# Patient Record
Sex: FEMALE | Race: WHITE | NOT HISPANIC OR LATINO | Employment: OTHER | URBAN - METROPOLITAN AREA
[De-identification: names, ages, dates, MRNs, and addresses within clinical notes are randomized per-mention and may not be internally consistent; named-entity substitution may affect disease eponyms.]

---

## 2022-07-04 ENCOUNTER — HOSPITAL ENCOUNTER (EMERGENCY)
Facility: HOSPITAL | Age: 87
Discharge: HOME | End: 2022-07-05
Attending: EMERGENCY MEDICINE
Payer: MEDICARE

## 2022-07-04 DIAGNOSIS — R56.9 SEIZURE-LIKE ACTIVITY: ICD-10-CM

## 2022-07-04 DIAGNOSIS — R41.82 ALTERED MENTAL STATUS: ICD-10-CM

## 2022-07-04 DIAGNOSIS — N39.0 URINARY TRACT INFECTION WITHOUT HEMATURIA, SITE UNSPECIFIED: Primary | ICD-10-CM

## 2022-07-04 LAB
ALBUMIN SERPL BCP-MCNC: 3.7 G/DL (ref 3.5–5.2)
ALP SERPL-CCNC: 36 U/L (ref 55–135)
ALT SERPL W/O P-5'-P-CCNC: 6 U/L (ref 10–44)
ANION GAP SERPL CALC-SCNC: 13 MMOL/L (ref 8–16)
AST SERPL-CCNC: 15 U/L (ref 10–40)
BASOPHILS # BLD AUTO: 0.05 K/UL (ref 0–0.2)
BASOPHILS NFR BLD: 0.3 % (ref 0–1.9)
BILIRUB SERPL-MCNC: 0.5 MG/DL (ref 0.1–1)
BUN SERPL-MCNC: 24 MG/DL (ref 10–30)
CALCIUM SERPL-MCNC: 10.4 MG/DL (ref 8.7–10.5)
CHLORIDE SERPL-SCNC: 104 MMOL/L (ref 95–110)
CO2 SERPL-SCNC: 22 MMOL/L (ref 23–29)
CREAT SERPL-MCNC: 1.1 MG/DL (ref 0.5–1.4)
DIFFERENTIAL METHOD: ABNORMAL
EOSINOPHIL # BLD AUTO: 0.1 K/UL (ref 0–0.5)
EOSINOPHIL NFR BLD: 0.5 % (ref 0–8)
ERYTHROCYTE [DISTWIDTH] IN BLOOD BY AUTOMATED COUNT: 13.2 % (ref 11.5–14.5)
EST. GFR  (AFRICAN AMERICAN): 49 ML/MIN/1.73 M^2
EST. GFR  (NON AFRICAN AMERICAN): 42 ML/MIN/1.73 M^2
GLUCOSE SERPL-MCNC: 153 MG/DL (ref 70–110)
HCT VFR BLD AUTO: 45.1 % (ref 37–48.5)
HGB BLD-MCNC: 15.2 G/DL (ref 12–16)
IMM GRANULOCYTES # BLD AUTO: 0.16 K/UL (ref 0–0.04)
IMM GRANULOCYTES NFR BLD AUTO: 0.8 % (ref 0–0.5)
LYMPHOCYTES # BLD AUTO: 2.3 K/UL (ref 1–4.8)
LYMPHOCYTES NFR BLD: 11.6 % (ref 18–48)
MAGNESIUM SERPL-MCNC: 1.8 MG/DL (ref 1.6–2.6)
MCH RBC QN AUTO: 32.4 PG (ref 27–31)
MCHC RBC AUTO-ENTMCNC: 33.7 G/DL (ref 32–36)
MCV RBC AUTO: 96 FL (ref 82–98)
MONOCYTES # BLD AUTO: 1.9 K/UL (ref 0.3–1)
MONOCYTES NFR BLD: 9.7 % (ref 4–15)
NEUTROPHILS # BLD AUTO: 15.1 K/UL (ref 1.8–7.7)
NEUTROPHILS NFR BLD: 77.1 % (ref 38–73)
NRBC BLD-RTO: 0 /100 WBC
PHOSPHATE SERPL-MCNC: 4.2 MG/DL (ref 2.7–4.5)
PLATELET # BLD AUTO: 325 K/UL (ref 150–450)
PMV BLD AUTO: 9.7 FL (ref 9.2–12.9)
POTASSIUM SERPL-SCNC: 4.1 MMOL/L (ref 3.5–5.1)
PROT SERPL-MCNC: 7.1 G/DL (ref 6–8.4)
RBC # BLD AUTO: 4.69 M/UL (ref 4–5.4)
SARS-COV-2 RDRP RESP QL NAA+PROBE: NEGATIVE
SODIUM SERPL-SCNC: 139 MMOL/L (ref 136–145)
WBC # BLD AUTO: 19.52 K/UL (ref 3.9–12.7)

## 2022-07-04 PROCEDURE — U0002 COVID-19 LAB TEST NON-CDC: HCPCS | Performed by: EMERGENCY MEDICINE

## 2022-07-04 PROCEDURE — 93005 ELECTROCARDIOGRAM TRACING: CPT

## 2022-07-04 PROCEDURE — 83605 ASSAY OF LACTIC ACID: CPT | Performed by: EMERGENCY MEDICINE

## 2022-07-04 PROCEDURE — 81000 URINALYSIS NONAUTO W/SCOPE: CPT | Performed by: EMERGENCY MEDICINE

## 2022-07-04 PROCEDURE — 84100 ASSAY OF PHOSPHORUS: CPT | Performed by: EMERGENCY MEDICINE

## 2022-07-04 PROCEDURE — 83735 ASSAY OF MAGNESIUM: CPT | Performed by: EMERGENCY MEDICINE

## 2022-07-04 PROCEDURE — 99285 EMERGENCY DEPT VISIT HI MDM: CPT | Mod: 25

## 2022-07-04 PROCEDURE — 84439 ASSAY OF FREE THYROXINE: CPT | Performed by: EMERGENCY MEDICINE

## 2022-07-04 PROCEDURE — 80053 COMPREHEN METABOLIC PANEL: CPT | Performed by: EMERGENCY MEDICINE

## 2022-07-04 PROCEDURE — 96365 THER/PROPH/DIAG IV INF INIT: CPT

## 2022-07-04 PROCEDURE — 87086 URINE CULTURE/COLONY COUNT: CPT | Performed by: EMERGENCY MEDICINE

## 2022-07-04 PROCEDURE — 93010 EKG 12-LEAD: ICD-10-PCS | Mod: ,,, | Performed by: INTERNAL MEDICINE

## 2022-07-04 PROCEDURE — 84443 ASSAY THYROID STIM HORMONE: CPT | Performed by: EMERGENCY MEDICINE

## 2022-07-04 PROCEDURE — 84484 ASSAY OF TROPONIN QUANT: CPT | Performed by: EMERGENCY MEDICINE

## 2022-07-04 PROCEDURE — 93010 ELECTROCARDIOGRAM REPORT: CPT | Mod: ,,, | Performed by: INTERNAL MEDICINE

## 2022-07-04 PROCEDURE — 85025 COMPLETE CBC W/AUTO DIFF WBC: CPT | Performed by: EMERGENCY MEDICINE

## 2022-07-05 VITALS
WEIGHT: 117 LBS | BODY MASS INDEX: 20.73 KG/M2 | DIASTOLIC BLOOD PRESSURE: 64 MMHG | SYSTOLIC BLOOD PRESSURE: 138 MMHG | HEART RATE: 76 BPM | OXYGEN SATURATION: 96 % | TEMPERATURE: 99 F | RESPIRATION RATE: 15 BRPM

## 2022-07-05 LAB
BACTERIA #/AREA URNS HPF: ABNORMAL /HPF
BILIRUB UR QL STRIP: NEGATIVE
CLARITY UR: CLEAR
COLOR UR: YELLOW
GLUCOSE UR QL STRIP: NEGATIVE
HGB UR QL STRIP: ABNORMAL
HYALINE CASTS #/AREA URNS LPF: 0 /LPF
KETONES UR QL STRIP: NEGATIVE
LACTATE SERPL-SCNC: 1.5 MMOL/L (ref 0.5–2.2)
LEUKOCYTE ESTERASE UR QL STRIP: ABNORMAL
MICROSCOPIC COMMENT: ABNORMAL
NITRITE UR QL STRIP: NEGATIVE
PH UR STRIP: 6 [PH] (ref 5–8)
PROT UR QL STRIP: ABNORMAL
RBC #/AREA URNS HPF: 1 /HPF (ref 0–4)
SP GR UR STRIP: 1.02 (ref 1–1.03)
T4 FREE SERPL-MCNC: 1.25 NG/DL (ref 0.71–1.51)
TROPONIN I SERPL DL<=0.01 NG/ML-MCNC: <0.006 NG/ML (ref 0–0.03)
TSH SERPL DL<=0.005 MIU/L-ACNC: 7.4 UIU/ML (ref 0.4–4)
URN SPEC COLLECT METH UR: ABNORMAL
UROBILINOGEN UR STRIP-ACNC: NEGATIVE EU/DL
WBC #/AREA URNS HPF: 65 /HPF (ref 0–5)
WBC CLUMPS URNS QL MICRO: ABNORMAL

## 2022-07-05 PROCEDURE — 63600175 PHARM REV CODE 636 W HCPCS: Performed by: EMERGENCY MEDICINE

## 2022-07-05 RX ORDER — CEFDINIR 300 MG/1
300 CAPSULE ORAL 2 TIMES DAILY
Qty: 10 CAPSULE | Refills: 0 | Status: SHIPPED | OUTPATIENT
Start: 2022-07-05 | End: 2022-07-10

## 2022-07-05 RX ADMIN — CEFTRIAXONE 1 G: 1 INJECTION, SOLUTION INTRAVENOUS at 01:07

## 2022-07-05 NOTE — ED PROVIDER NOTES
SCRIBE #1 NOTE: ILauren, am scribing for, and in the presence of, No att. providers found. I have scribed the entire note.       History     Chief Complaint   Patient presents with    Altered Mental Status     Pt was brought in by ems due to having altered mental status. Pt is awake but not alert. Hx of a stroke with right sided weakness.      Review of patient's allergies indicates:   Allergen Reactions    Bactrim [sulfamethoxazole-trimethoprim] Rash    Gentamicin Rash    Sulfa (sulfonamide antibiotics) Rash         History of Present Illness     HPI    7/5/2022, 12:09 AM  History obtained from the daughter and patient      History of Present Illness: Martine Garvey is a 89 y.o. female patient with a PMHx of CAD and HTN who presents to the Emergency Department for evaluation of possible seizure which onset tonight. Pt went to the bathroom as she started to stand she sat back down in a dead stare and started drooling. Symptoms are constant and moderate in severity. No mitigating or exacerbating factors reported. Associated sxs include  Abdominal pain. Patient denies any Fever, chills, vomiting, SOB, sore throat, and all other sxs at this time. No further complaints or concerns at this time.       Arrival mode: EMS     PCP: Primary Doctor No        Past Medical History:  Past Medical History:   Diagnosis Date    Afib     Coronary artery disease     Depression     Hypertension     Thyroid disease        Past Surgical History:  Past Surgical History:   Procedure Laterality Date    APPENDECTOMY      BRAIN SURGERY      CHOLECYSTECTOMY      EYE SURGERY      TONSILLECTOMY           Family History:  No family history on file.    Social History:  Social History     Tobacco Use    Smoking status: Never Smoker    Smokeless tobacco: Not on file   Substance and Sexual Activity    Alcohol use: No    Drug use: No    Sexual activity: Not on file        Review of Systems     Review of Systems    Constitutional: Negative for chills and fever.   HENT: Negative for sore throat.    Respiratory: Negative for shortness of breath.    Cardiovascular: Negative for chest pain.   Gastrointestinal: Positive for abdominal pain. Negative for nausea and vomiting.   Genitourinary: Negative for dysuria.   Musculoskeletal: Negative for back pain.   Skin: Negative for rash.   Neurological: Negative for weakness.   Hematological: Does not bruise/bleed easily.   All other systems reviewed and are negative.     Physical Exam     Initial Vitals   BP Pulse Resp Temp SpO2   07/04/22 2143 07/04/22 2143 07/04/22 2143 07/05/22 0144 07/04/22 2143   (!) 176/74 62 15 98.7 °F (37.1 °C) (!) 94 %      MAP       --                 Physical Exam  Nursing Notes and Vital Signs Reviewed.  Constitutional: Patient is in no acute distress. Well-developed and well-nourished.  Head: Atraumatic. Normocephalic.  Eyes: PERRL. EOM intact. Conjunctivae are not pale. No scleral icterus.  ENT: Mucous membranes are moist. Oropharynx is clear and symmetric.    Neck: Supple. Full ROM. No lymphadenopathy.  Cardiovascular: Regular rate. Accelerated junctional rhythm. No murmurs, rubs, or gallops. Distal pulses are 2+ and symmetric.  Pulmonary/Chest: No respiratory distress. Clear to auscultation bilaterally. No wheezing or rales.  Abdominal: Soft and non-distended.  There is no tenderness.  No rebound, guarding, or rigidity. Good bowel sounds.  Genitourinary: No CVA tenderness  Musculoskeletal: Moves all extremities. No obvious deformities. No edema. No calf tenderness.  Skin: Warm and dry.  Neurological:  Alert, awake, and appropriate.  Normal speech.  No acute focal neurological deficits are appreciated.  Psychiatric: Normal affect. Good eye contact. Appropriate in content.     ED Course   Procedures  ED Vital Signs:  Vitals:    07/04/22 2143 07/05/22 0000 07/05/22 0030 07/05/22 0100   BP: (!) 176/74 (!) 150/67 129/62 (!) 140/63   Pulse: 62 72 75 74   Resp:  15   15   Temp:       TempSrc:       SpO2: (!) 94% 96% 96% 96%   Weight: 53.1 kg (117 lb)       07/05/22 0144   BP: 138/64   Pulse: 76   Resp:    Temp: 98.7 °F (37.1 °C)   TempSrc: Oral   SpO2:    Weight:        Abnormal Lab Results:  Labs Reviewed   CBC W/ AUTO DIFFERENTIAL - Abnormal; Notable for the following components:       Result Value    WBC 19.52 (*)     MCH 32.4 (*)     Immature Granulocytes 0.8 (*)     Gran # (ANC) 15.1 (*)     Immature Grans (Abs) 0.16 (*)     Mono # 1.9 (*)     Gran % 77.1 (*)     Lymph % 11.6 (*)     All other components within normal limits   COMPREHENSIVE METABOLIC PANEL - Abnormal; Notable for the following components:    CO2 22 (*)     Glucose 153 (*)     Alkaline Phosphatase 36 (*)     ALT 6 (*)     eGFR if  49 (*)     eGFR if non  42 (*)     All other components within normal limits   TSH - Abnormal; Notable for the following components:    TSH 7.401 (*)     All other components within normal limits   URINALYSIS, REFLEX TO URINE CULTURE - Abnormal; Notable for the following components:    Protein, UA 1+ (*)     Occult Blood UA Trace (*)     Leukocytes, UA 2+ (*)     All other components within normal limits    Narrative:     Specimen Source->Urine   URINALYSIS MICROSCOPIC - Abnormal; Notable for the following components:    WBC, UA 65 (*)     All other components within normal limits    Narrative:     Specimen Source->Urine   CULTURE, URINE    Narrative:     Specimen Source->Urine   LACTIC ACID, PLASMA   MAGNESIUM   PHOSPHORUS   SARS-COV-2 RNA AMPLIFICATION, QUAL   TROPONIN I   T4, FREE        All Lab Results:  Results for orders placed or performed during the hospital encounter of 07/04/22   Urine culture    Specimen: Urine   Result Value Ref Range    Urine Culture, Routine No significant growth    CBC auto differential   Result Value Ref Range    WBC 19.52 (H) 3.90 - 12.70 K/uL    RBC 4.69 4.00 - 5.40 M/uL    Hemoglobin 15.2 12.0 - 16.0 g/dL     Hematocrit 45.1 37.0 - 48.5 %    MCV 96 82 - 98 fL    MCH 32.4 (H) 27.0 - 31.0 pg    MCHC 33.7 32.0 - 36.0 g/dL    RDW 13.2 11.5 - 14.5 %    Platelets 325 150 - 450 K/uL    MPV 9.7 9.2 - 12.9 fL    Immature Granulocytes 0.8 (H) 0.0 - 0.5 %    Gran # (ANC) 15.1 (H) 1.8 - 7.7 K/uL    Immature Grans (Abs) 0.16 (H) 0.00 - 0.04 K/uL    Lymph # 2.3 1.0 - 4.8 K/uL    Mono # 1.9 (H) 0.3 - 1.0 K/uL    Eos # 0.1 0.0 - 0.5 K/uL    Baso # 0.05 0.00 - 0.20 K/uL    nRBC 0 0 /100 WBC    Gran % 77.1 (H) 38.0 - 73.0 %    Lymph % 11.6 (L) 18.0 - 48.0 %    Mono % 9.7 4.0 - 15.0 %    Eosinophil % 0.5 0.0 - 8.0 %    Basophil % 0.3 0.0 - 1.9 %    Differential Method Automated    Comprehensive metabolic panel   Result Value Ref Range    Sodium 139 136 - 145 mmol/L    Potassium 4.1 3.5 - 5.1 mmol/L    Chloride 104 95 - 110 mmol/L    CO2 22 (L) 23 - 29 mmol/L    Glucose 153 (H) 70 - 110 mg/dL    BUN 24 10 - 30 mg/dL    Creatinine 1.1 0.5 - 1.4 mg/dL    Calcium 10.4 8.7 - 10.5 mg/dL    Total Protein 7.1 6.0 - 8.4 g/dL    Albumin 3.7 3.5 - 5.2 g/dL    Total Bilirubin 0.5 0.1 - 1.0 mg/dL    Alkaline Phosphatase 36 (L) 55 - 135 U/L    AST 15 10 - 40 U/L    ALT 6 (L) 10 - 44 U/L    Anion Gap 13 8 - 16 mmol/L    eGFR if African American 49 (A) >60 mL/min/1.73 m^2    eGFR if non African American 42 (A) >60 mL/min/1.73 m^2   TSH   Result Value Ref Range    TSH 7.401 (H) 0.400 - 4.000 uIU/mL   Lactic acid, plasma   Result Value Ref Range    Lactate (Lactic Acid) 1.5 0.5 - 2.2 mmol/L   Magnesium   Result Value Ref Range    Magnesium 1.8 1.6 - 2.6 mg/dL   Phosphorus   Result Value Ref Range    Phosphorus 4.2 2.7 - 4.5 mg/dL   COVID-19 Rapid Screening   Result Value Ref Range    SARS-CoV-2 RNA, Amplification, Qual Negative Negative   Troponin I   Result Value Ref Range    Troponin I <0.006 0.000 - 0.026 ng/mL   Urinalysis, Reflex to Urine Culture Urine, Catheterized    Specimen: Urine   Result Value Ref Range    Specimen UA Urine, Catheterized      Color, UA Yellow Yellow, Straw, Mariaelena    Appearance, UA Clear Clear    pH, UA 6.0 5.0 - 8.0    Specific Gravity, UA 1.025 1.005 - 1.030    Protein, UA 1+ (A) Negative    Glucose, UA Negative Negative    Ketones, UA Negative Negative    Bilirubin (UA) Negative Negative    Occult Blood UA Trace (A) Negative    Nitrite, UA Negative Negative    Urobilinogen, UA Negative <2.0 EU/dL    Leukocytes, UA 2+ (A) Negative   Urinalysis Microscopic   Result Value Ref Range    RBC, UA 1 0 - 4 /hpf    WBC, UA 65 (H) 0 - 5 /hpf    WBC Clumps, UA Rare None-Rare    Bacteria Rare None-Occ /hpf    Hyaline Casts, UA 0 0-1/lpf /lpf    Microscopic Comment SEE COMMENT    T4, Free   Result Value Ref Range    Free T4 1.25 0.71 - 1.51 ng/dL         Imaging Results:  Imaging Results          CT Head Without Contrast (Final result)  Result time 07/04/22 23:21:06    Final result by Kenya Gale MD (07/04/22 23:21:06)                 Impression:      No acute abnormality.    Atrophy and chronic white matter changes    No hemorrhage mass effect or midline shift.    All CT scans   are performed using dose optimization techniques including the following: automated exposure control; adjustment of the mA and/or kV; use of iterative reconstruction technique.  Dose modulation was employed for ALARA by means of: Automated exposure control; adjustment of the mA and/or kV according to patient size (this includes techniques or standardized protocols for targeted exams where dose is matched to indication/reason for exam; i.e. extremities or head); and/or use of iterative reconstructive technique.      Electronically signed by: Baljinder Zambrano  Date:    07/04/2022  Time:    23:21             Narrative:    EXAMINATION:  CT HEAD WITHOUT CONTRAST    CLINICAL HISTORY:  Mental status change, unknown cause;    TECHNIQUE:  Low dose axial CT images obtained throughout the head without intravenous contrast. Sagittal and coronal reconstructions were  performed.    COMPARISON:  Prior    FINDINGS:  Atrophy and chronic white matter changes.  Old infarct involving the right posterior occipital temporal lobe.  No hemorrhage mass effect or midline shift.  Postsurgical changes with metallic coiled in the parasellar region.    Mild motion artifacts                               X-Ray Chest AP Portable (Final result)  Result time 07/04/22 22:28:25    Final result by Kenya Gale MD (07/04/22 22:28:25)                 Impression:      No acute abnormality.      Electronically signed by: Baljinder Zambrano  Date:    07/04/2022  Time:    22:28             Narrative:    EXAMINATION:  XR CHEST AP PORTABLE    CLINICAL HISTORY:  Altered mental status, unspecified    TECHNIQUE:  Single frontal view of the chest was performed.    COMPARISON:  None    FINDINGS:  Hernia hiatal hernia.  Atherosclerotic changesthe lungs are clear, with normal appearance of pulmonary vasculature and no pleural effusion or pneumothorax.    The cardiac silhouette is normal in size. The hilar and mediastinal contours are unremarkable.    Bones are intact.                                 The EKG was ordered, reviewed, and independently interpreted by the ED provider.  Interpretation time: 22:17  Rate: 68 BPM  Rhythm: Accelerated Junctional rhythm  Interpretation: Left axis deviation. Inferior infract. Anterolateral infract. No STEMI.           The Emergency Provider reviewed the vital signs and test results, which are outlined above.     ED Discussion     1:04 AM: Reassessed pt at this time. Discussed with pt all pertinent ED information and results. Discussed pt dx and plan of tx. Gave pt all f/u and return to the ED instructions. All questions and concerns were addressed at this time. Pt expresses understanding of information and instructions, and is comfortable with plan to discharge. Pt is stable for discharge.    I discussed with patient and/or family/caretaker that evaluation in the ED does not suggest  any emergent or life threatening medical conditions requiring immediate intervention beyond what was provided in the ED, and I believe patient is safe for discharge.  Regardless, an unremarkable evaluation in the ED does not preclude the development or presence of a serious of life threatening condition. As such, patient was instructed to return immediately for any worsening or change in current symptoms.       Medical Decision Making:   Clinical Tests:   Lab Tests: Ordered and Reviewed  Radiological Study: Ordered and Reviewed  Medical Tests: Ordered and Reviewed           ED Medication(s):  Medications   cefTRIAXone (ROCEPHIN) 1 g/50 mL D5W IVPB (0 g Intravenous Stopped 7/5/22 0130)       Discharge Medication List as of 7/5/2022  1:01 AM      START taking these medications    Details   cefdinir (OMNICEF) 300 MG capsule Take 1 capsule (300 mg total) by mouth 2 (two) times daily. for 5 days, Starting Tue 7/5/2022, Until Sun 7/10/2022, Print              Follow-up Information     The [address] In 2 days.    Specialty: Internal Medicine  Contact information:  [address] [facility] [address] [phone]  Additional information:  Please park on the [address] side and use the Clinic entrance. Check in on the 2nd floor, to the right.           [facility] - Emergency Dept..    Specialty: Emergency Medicine  Why: As needed, If symptoms worsen  Contact information:  [address] [facility] [address] [phone]                           Scribe Attestation:   Scribe #1: I performed the above scribed service and the documentation accurately describes the services I performed. I attest to the accuracy of the note.     Attending:   Physician Attestation Statement for Scribe #1: I, No att. providers found, personally performed the services described in this documentation, as scribed by Lauren Neff, in my presence, and it is both accurate and complete.            Clinical Impression       ICD-10-CM ICD-9-CM   1. Urinary tract infection without hematuria, site unspecified  N39.0 599.0   2. Altered mental status  R41.82 780.97   3. Seizure-like activity  R56.9 780.39       Disposition:   Disposition: Discharged  Condition: Stable         Lauren Neff MD  07/13/22 0586

## 2022-07-06 LAB — BACTERIA UR CULT: NORMAL

## 2022-07-25 ENCOUNTER — HOSPITAL ENCOUNTER (OUTPATIENT)
Facility: HOSPITAL | Age: 87
Discharge: HOME | End: 2022-07-27
Attending: EMERGENCY MEDICINE | Admitting: INTERNAL MEDICINE
Payer: MEDICARE

## 2022-07-25 DIAGNOSIS — R79.89 TROPONIN LEVEL ELEVATED: Primary | ICD-10-CM

## 2022-07-25 DIAGNOSIS — U07.1 COVID-19: ICD-10-CM

## 2022-07-25 DIAGNOSIS — R56.9 SINGLE SEIZURE: ICD-10-CM

## 2022-07-25 DIAGNOSIS — R55 SYNCOPE: ICD-10-CM

## 2022-07-25 DIAGNOSIS — D72.829 LEUKOCYTOSIS, UNSPECIFIED TYPE: ICD-10-CM

## 2022-07-25 PROCEDURE — 85025 COMPLETE CBC W/AUTO DIFF WBC: CPT | Performed by: EMERGENCY MEDICINE

## 2022-07-25 PROCEDURE — 99285 EMERGENCY DEPT VISIT HI MDM: CPT | Mod: 25

## 2022-07-25 PROCEDURE — 84484 ASSAY OF TROPONIN QUANT: CPT | Performed by: EMERGENCY MEDICINE

## 2022-07-25 PROCEDURE — 83690 ASSAY OF LIPASE: CPT | Performed by: EMERGENCY MEDICINE

## 2022-07-25 PROCEDURE — 93010 ELECTROCARDIOGRAM REPORT: CPT | Mod: ,,, | Performed by: INTERNAL MEDICINE

## 2022-07-25 PROCEDURE — 93005 ELECTROCARDIOGRAM TRACING: CPT

## 2022-07-25 PROCEDURE — 80053 COMPREHEN METABOLIC PANEL: CPT | Performed by: EMERGENCY MEDICINE

## 2022-07-25 PROCEDURE — 93010 EKG 12-LEAD: ICD-10-PCS | Mod: ,,, | Performed by: INTERNAL MEDICINE

## 2022-07-25 PROCEDURE — 96361 HYDRATE IV INFUSION ADD-ON: CPT

## 2022-07-25 PROCEDURE — P9612 CATHETERIZE FOR URINE SPEC: HCPCS

## 2022-07-25 PROCEDURE — 25000003 PHARM REV CODE 250: Performed by: EMERGENCY MEDICINE

## 2022-07-25 RX ADMIN — SODIUM CHLORIDE 250 ML: 0.9 INJECTION, SOLUTION INTRAVENOUS at 11:07

## 2022-07-26 PROBLEM — E87.6 HYPOKALEMIA: Status: ACTIVE | Noted: 2022-07-26

## 2022-07-26 PROBLEM — U07.1 COVID-19: Status: ACTIVE | Noted: 2022-07-26

## 2022-07-26 PROBLEM — D72.829 LEUCOCYTOSIS: Status: ACTIVE | Noted: 2022-07-26

## 2022-07-26 PROBLEM — R55 SYNCOPE: Status: ACTIVE | Noted: 2022-07-26

## 2022-07-26 PROBLEM — I48.0 PAF (PAROXYSMAL ATRIAL FIBRILLATION): Status: ACTIVE | Noted: 2019-02-07

## 2022-07-26 LAB
ALBUMIN SERPL BCP-MCNC: 4 G/DL (ref 3.5–5.2)
ALP SERPL-CCNC: 39 U/L (ref 55–135)
ALT SERPL W/O P-5'-P-CCNC: 9 U/L (ref 10–44)
ANION GAP SERPL CALC-SCNC: 14 MMOL/L (ref 8–16)
AST SERPL-CCNC: 19 U/L (ref 10–40)
BASOPHILS # BLD AUTO: 0.05 K/UL (ref 0–0.2)
BASOPHILS NFR BLD: 0.2 % (ref 0–1.9)
BILIRUB SERPL-MCNC: 0.5 MG/DL (ref 0.1–1)
BILIRUB UR QL STRIP: NEGATIVE
BUN SERPL-MCNC: 14 MG/DL (ref 10–30)
CALCIUM SERPL-MCNC: 10.4 MG/DL (ref 8.7–10.5)
CHLORIDE SERPL-SCNC: 104 MMOL/L (ref 95–110)
CLARITY UR: CLEAR
CO2 SERPL-SCNC: 22 MMOL/L (ref 23–29)
COLOR UR: COLORLESS
CREAT SERPL-MCNC: 0.8 MG/DL (ref 0.5–1.4)
DIFFERENTIAL METHOD: ABNORMAL
EOSINOPHIL # BLD AUTO: 0 K/UL (ref 0–0.5)
EOSINOPHIL NFR BLD: 0.1 % (ref 0–8)
ERYTHROCYTE [DISTWIDTH] IN BLOOD BY AUTOMATED COUNT: 12.7 % (ref 11.5–14.5)
EST. GFR  (AFRICAN AMERICAN): >60 ML/MIN/1.73 M^2
EST. GFR  (NON AFRICAN AMERICAN): >60 ML/MIN/1.73 M^2
GLUCOSE SERPL-MCNC: 132 MG/DL (ref 70–110)
GLUCOSE UR QL STRIP: NEGATIVE
HCT VFR BLD AUTO: 43 % (ref 37–48.5)
HGB BLD-MCNC: 14.3 G/DL (ref 12–16)
HGB UR QL STRIP: NEGATIVE
IMM GRANULOCYTES # BLD AUTO: 0.12 K/UL (ref 0–0.04)
IMM GRANULOCYTES NFR BLD AUTO: 0.6 % (ref 0–0.5)
KETONES UR QL STRIP: NEGATIVE
LEUKOCYTE ESTERASE UR QL STRIP: NEGATIVE
LIPASE SERPL-CCNC: 80 U/L (ref 4–60)
LYMPHOCYTES # BLD AUTO: 2 K/UL (ref 1–4.8)
LYMPHOCYTES NFR BLD: 10 % (ref 18–48)
MCH RBC QN AUTO: 32.5 PG (ref 27–31)
MCHC RBC AUTO-ENTMCNC: 33.3 G/DL (ref 32–36)
MCV RBC AUTO: 98 FL (ref 82–98)
MONOCYTES # BLD AUTO: 1.6 K/UL (ref 0.3–1)
MONOCYTES NFR BLD: 7.7 % (ref 4–15)
NEUTROPHILS # BLD AUTO: 16.5 K/UL (ref 1.8–7.7)
NEUTROPHILS NFR BLD: 81.4 % (ref 38–73)
NITRITE UR QL STRIP: NEGATIVE
NRBC BLD-RTO: 0 /100 WBC
PH UR STRIP: 7 [PH] (ref 5–8)
PLATELET # BLD AUTO: 279 K/UL (ref 150–450)
PMV BLD AUTO: 9.5 FL (ref 9.2–12.9)
POTASSIUM SERPL-SCNC: 3.4 MMOL/L (ref 3.5–5.1)
PROT SERPL-MCNC: 7.4 G/DL (ref 6–8.4)
PROT UR QL STRIP: ABNORMAL
RBC # BLD AUTO: 4.4 M/UL (ref 4–5.4)
SARS-COV-2 RDRP RESP QL NAA+PROBE: POSITIVE
SODIUM SERPL-SCNC: 140 MMOL/L (ref 136–145)
SP GR UR STRIP: <1.005 (ref 1–1.03)
TROPONIN I SERPL DL<=0.01 NG/ML-MCNC: 0.04 NG/ML (ref 0–0.03)
URN SPEC COLLECT METH UR: ABNORMAL
UROBILINOGEN UR STRIP-ACNC: NEGATIVE EU/DL
WBC # BLD AUTO: 20.3 K/UL (ref 3.9–12.7)

## 2022-07-26 PROCEDURE — 25000003 PHARM REV CODE 250: Performed by: EMERGENCY MEDICINE

## 2022-07-26 PROCEDURE — U0002 COVID-19 LAB TEST NON-CDC: HCPCS | Performed by: EMERGENCY MEDICINE

## 2022-07-26 PROCEDURE — 25000003 PHARM REV CODE 250: Performed by: INTERNAL MEDICINE

## 2022-07-26 PROCEDURE — 63600175 PHARM REV CODE 636 W HCPCS: Performed by: INTERNAL MEDICINE

## 2022-07-26 PROCEDURE — G0378 HOSPITAL OBSERVATION PER HR: HCPCS

## 2022-07-26 PROCEDURE — 81003 URINALYSIS AUTO W/O SCOPE: CPT | Performed by: EMERGENCY MEDICINE

## 2022-07-26 PROCEDURE — 96365 THER/PROPH/DIAG IV INF INIT: CPT

## 2022-07-26 PROCEDURE — A4216 STERILE WATER/SALINE, 10 ML: HCPCS | Performed by: INTERNAL MEDICINE

## 2022-07-26 RX ORDER — ACETAMINOPHEN 325 MG/1
650 TABLET ORAL EVERY 4 HOURS PRN
Status: DISCONTINUED | OUTPATIENT
Start: 2022-07-26 | End: 2022-07-27 | Stop reason: HOSPADM

## 2022-07-26 RX ORDER — LEVOTHYROXINE SODIUM 75 UG/1
75 TABLET ORAL DAILY
Status: DISCONTINUED | OUTPATIENT
Start: 2022-07-26 | End: 2022-07-27 | Stop reason: HOSPADM

## 2022-07-26 RX ORDER — IBUPROFEN 200 MG
16 TABLET ORAL
Status: DISCONTINUED | OUTPATIENT
Start: 2022-07-26 | End: 2022-07-27 | Stop reason: HOSPADM

## 2022-07-26 RX ORDER — MONTELUKAST SODIUM 10 MG/1
10 TABLET ORAL DAILY
COMMUNITY
Start: 2022-06-26

## 2022-07-26 RX ORDER — SODIUM,POTASSIUM PHOSPHATES 280-250MG
2 POWDER IN PACKET (EA) ORAL
Status: DISCONTINUED | OUTPATIENT
Start: 2022-07-26 | End: 2022-07-27 | Stop reason: HOSPADM

## 2022-07-26 RX ORDER — LANOLIN ALCOHOL/MO/W.PET/CERES
800 CREAM (GRAM) TOPICAL
Status: DISCONTINUED | OUTPATIENT
Start: 2022-07-26 | End: 2022-07-27 | Stop reason: HOSPADM

## 2022-07-26 RX ORDER — TALC
6 POWDER (GRAM) TOPICAL NIGHTLY PRN
Status: DISCONTINUED | OUTPATIENT
Start: 2022-07-26 | End: 2022-07-27 | Stop reason: HOSPADM

## 2022-07-26 RX ORDER — CIPROFLOXACIN 250 MG/1
250 TABLET, FILM COATED ORAL DAILY PRN
COMMUNITY
Start: 2022-05-12

## 2022-07-26 RX ORDER — GLUCAGON 1 MG
1 KIT INJECTION
Status: DISCONTINUED | OUTPATIENT
Start: 2022-07-26 | End: 2022-07-27 | Stop reason: HOSPADM

## 2022-07-26 RX ORDER — ASPIRIN 325 MG
325 TABLET ORAL
Status: COMPLETED | OUTPATIENT
Start: 2022-07-26 | End: 2022-07-26

## 2022-07-26 RX ORDER — ASPIRIN 81 MG/1
81 TABLET ORAL DAILY
Status: DISCONTINUED | OUTPATIENT
Start: 2022-07-26 | End: 2022-07-27 | Stop reason: HOSPADM

## 2022-07-26 RX ORDER — AMLODIPINE BESYLATE 2.5 MG/1
2.5 TABLET ORAL DAILY
COMMUNITY
Start: 2022-06-26

## 2022-07-26 RX ORDER — POLYETHYLENE GLYCOL 3350 17 G/17G
17 POWDER, FOR SOLUTION ORAL DAILY PRN
Status: DISCONTINUED | OUTPATIENT
Start: 2022-07-26 | End: 2022-07-27 | Stop reason: HOSPADM

## 2022-07-26 RX ORDER — ATORVASTATIN CALCIUM 10 MG/1
20 TABLET, FILM COATED ORAL DAILY
Status: DISCONTINUED | OUTPATIENT
Start: 2022-07-26 | End: 2022-07-27 | Stop reason: HOSPADM

## 2022-07-26 RX ORDER — MIRABEGRON 50 MG/1
1 TABLET, FILM COATED, EXTENDED RELEASE ORAL DAILY
COMMUNITY
Start: 2022-07-24

## 2022-07-26 RX ORDER — NALOXONE HCL 0.4 MG/ML
0.02 VIAL (ML) INJECTION
Status: DISCONTINUED | OUTPATIENT
Start: 2022-07-26 | End: 2022-07-27 | Stop reason: HOSPADM

## 2022-07-26 RX ORDER — HYDROCODONE BITARTRATE AND ACETAMINOPHEN 5; 325 MG/1; MG/1
1 TABLET ORAL EVERY 6 HOURS PRN
Status: DISCONTINUED | OUTPATIENT
Start: 2022-07-26 | End: 2022-07-27 | Stop reason: HOSPADM

## 2022-07-26 RX ORDER — IBUPROFEN 200 MG
24 TABLET ORAL
Status: DISCONTINUED | OUTPATIENT
Start: 2022-07-26 | End: 2022-07-27 | Stop reason: HOSPADM

## 2022-07-26 RX ORDER — LISINOPRIL 20 MG/1
20 TABLET ORAL DAILY
Status: DISCONTINUED | OUTPATIENT
Start: 2022-07-26 | End: 2022-07-27 | Stop reason: HOSPADM

## 2022-07-26 RX ORDER — SODIUM CHLORIDE 0.9 % (FLUSH) 0.9 %
10 SYRINGE (ML) INJECTION EVERY 8 HOURS
Status: DISCONTINUED | OUTPATIENT
Start: 2022-07-26 | End: 2022-07-27 | Stop reason: HOSPADM

## 2022-07-26 RX ORDER — ESCITALOPRAM OXALATE 5 MG/1
5 TABLET ORAL DAILY
Status: DISCONTINUED | OUTPATIENT
Start: 2022-07-26 | End: 2022-07-27 | Stop reason: HOSPADM

## 2022-07-26 RX ADMIN — CEFTRIAXONE 2 G: 2 INJECTION, SOLUTION INTRAVENOUS at 06:07

## 2022-07-26 RX ADMIN — Medication 10 ML: at 06:07

## 2022-07-26 RX ADMIN — ESCITALOPRAM 5 MG: 5 TABLET, FILM COATED ORAL at 09:07

## 2022-07-26 RX ADMIN — ASPIRIN 81 MG: 81 TABLET, COATED ORAL at 08:07

## 2022-07-26 RX ADMIN — RIVAROXABAN 5 MG: 2.5 TABLET, FILM COATED ORAL at 05:07

## 2022-07-26 RX ADMIN — LISINOPRIL 20 MG: 20 TABLET ORAL at 10:07

## 2022-07-26 RX ADMIN — ATORVASTATIN CALCIUM 20 MG: 10 TABLET, FILM COATED ORAL at 08:07

## 2022-07-26 RX ADMIN — DIGOXIN 65 MCG: 0.05 SOLUTION ORAL at 08:07

## 2022-07-26 RX ADMIN — LEVOTHYROXINE SODIUM 75 MCG: 75 TABLET ORAL at 08:07

## 2022-07-26 RX ADMIN — ASPIRIN 325 MG ORAL TABLET 325 MG: 325 PILL ORAL at 12:07

## 2022-07-26 NOTE — PHARMACY MED REC
"Admission Medication History     The home medication history was taken by Garrett Manriquez.    You may go to "Admission" then "Reconcile Home Medications" tabs to review and/or act upon these items.      The home medication list has been updated by the Pharmacy department.    Please read ALL comments highlighted in yellow.    Please address this information as you see fit.     Feel free to contact us if you have any questions or require assistance.      Medications listed below were obtained from: Medications brought from home and Analytic software- [employer]  (Not in a hospital admission)      Garrett Manriquez  [id number]    Current Outpatient Medications on File Prior to Encounter   Medication Sig Dispense Refill Last Dose    amLODIPine (NORVASC) 2.5 MG tablet Take 2.5 mg by mouth once daily.       aspirin (ECOTRIN) 81 MG EC tablet Take 81 mg by mouth once daily.       atorvastatin (LIPITOR) 20 MG tablet Take 20 mg by mouth once daily.       ciprofloxacin HCl (CIPRO) 250 MG tablet Take 250 mg by mouth daily as needed.       digoxin 62.5 mcg (0.0625 mg) Tab Take 1 tablet by mouth once daily at 6am.       escitalopram oxalate (LEXAPRO) 5 MG Tab Take 5 mg by mouth once daily.       levothyroxine (SYNTHROID) 75 MCG tablet Take 75 mcg by mouth once daily.       lisinopril (PRINIVIL,ZESTRIL) 20 MG tablet Take 20 mg by mouth once daily.       montelukast (SINGULAIR) 10 mg tablet Take 10 mg by mouth once daily.       MYRBETRIQ 50 mg Tb24 Take 1 tablet by mouth once daily.       RIVAROXABAN (XARELTO ORAL) Take 5 mg by mouth once daily.                                .          "

## 2022-07-26 NOTE — ASSESSMENT & PLAN NOTE
Initiate standard COVID protocols; COVID-19 testing ,Infection Control notification  and isolation- respiratory, contact and droplet per protocol    Diagnostics: (leukopenia, hyponatremia, hyperferritinemia, elevated troponin, elevated d-dimer, age, and comorbidities are significant predictors of poor clinical outcome)  CBC, CMP, Ferritin, CRP and Portable CXR    Management: Inhaled bronchodilators as needed for shortness of breath.   Daughter wants a repeat test as she does not believe this test . The patient does not leave the house

## 2022-07-26 NOTE — ED PROVIDER NOTES
SCRIBE #1 NOTE: I, Cal Guardado, am scribing for, and in the presence of, Roberto Purdy Jr., MD. I have scribed the entire note.       History     Chief Complaint   Patient presents with    Loss of Consciousness     Pt was found by EMS with a reduced level of consciousness. Once stimulated pt became alert and oriented. She was initially hypotensive. It improved with stimulation. Family reported to EMS that she took a extra Amlodapine to treat her hypertension today.      Review of patient's allergies indicates:   Allergen Reactions    Bactrim [sulfamethoxazole-trimethoprim] Rash    Gentamicin Rash    Sulfa (sulfonamide antibiotics) Rash         History of Present Illness     HPI    7/25/2022, 11:29 PM  History obtained from the patient and EMS      History of Present Illness: Martine Garvey is a 89 y.o. female patient with a PMHx of HTN, thyroid disease, and CAD who presents to the Emergency Department for evaluation of a reduced level of consciouesness which onset suddenly pta. Per EMS, pt was found with a reduced level of consciousness and became alert and oreiented when she was stimulated. EMS states she was intially hypotensive but improved with stimulation. EMS states the family reported the pt took an extra Amlodipine to treat her hypertension today. Symptoms are constant and moderate in severity. No mitigating or exacerbating factors reported. Associated sxs include pelvic pain. Patient denies any fever, chills, CP, abdominal pain, SOB, flank pain, myalgias, weakness, dizziness, and all other sxs at this time. No prior Tx reported. No further complaints or concerns at this time.       Arrival mode: EMS      PCP: Primary Doctor No        Past Medical History:  Past Medical History:   Diagnosis Date    Afib     Coronary artery disease     Depression     Hypertension     Thyroid disease        Past Surgical History:  Past Surgical History:   Procedure Laterality Date    APPENDECTOMY      BRAIN  SURGERY      CHOLECYSTECTOMY      EYE SURGERY      TONSILLECTOMY           Family History:  No family history on file.    Social History:  Social History     Tobacco Use    Smoking status: Never Smoker    Smokeless tobacco: Not on file   Substance and Sexual Activity    Alcohol use: No    Drug use: No    Sexual activity: Not on file        Review of Systems     Review of Systems   Constitutional: Negative for fever.   HENT: Negative for sore throat.    Respiratory: Negative for shortness of breath.    Cardiovascular: Negative for chest pain.   Gastrointestinal: Negative for abdominal pain, diarrhea, nausea and vomiting.   Genitourinary: Positive for pelvic pain. Negative for dysuria.   Musculoskeletal: Negative for back pain.   Skin: Negative for rash.   Neurological: Positive for syncope. Negative for dizziness, weakness, light-headedness and headaches.   Hematological: Does not bruise/bleed easily.   Psychiatric/Behavioral: Negative for confusion.   All other systems reviewed and are negative.     Physical Exam     Initial Vitals [07/25/22 2106]   BP Pulse Resp Temp SpO2   (!) 130/57 68 15 97.5 °F (36.4 °C) 95 %      MAP       --          Physical Exam   Nursing Notes and Vital Signs Reviewed.  Constitutional: Patient is in no acute distress. Well-developed and well-nourished.  Head: Atraumatic. Normocephalic.  Eyes:  EOM intact.  No scleral icterus.  ENT: Mucous membranes are moist.  Nares clear   Neck:  Full ROM. No JVD.  Cardiovascular: Regular rate. Regular rhythm No murmurs, rubs, or gallops. Distal pulses are 2+ and symmetric  Pulmonary/Chest: No respiratory distress. Clear to auscultation bilaterally. No wheezing or rales.  Equal chest wall rise bilaterally  Abdominal: Soft and non-distended.  There is no tenderness.  No rebound, guarding, or rigidity. Good bowel sounds.  Genitourinary: No CVA tenderness.  No suprapubic tenderness  Musculoskeletal: Moves all extremities. No obvious deformities.  5 x  5 strength in all extremities   Skin: Warm and dry.  Neurological:  Alert, awake, and appropriate.  Normal speech.  No acute focal neurological deficits are appreciated.  Two through 12 intact bilaterally.  Psychiatric: Normal affect. Good eye contact. Appropriate in content.       ED Course   Procedures  ED Vital Signs:  Vitals:    07/25/22 2106 07/25/22 2332 07/26/22 0003 07/26/22 0132   BP: (!) 130/57 (!) 187/77 (!) 174/74 (!) 150/67   Pulse: 68 71 63 61   Resp: 15 (!) 21 (!) 21 18   Temp: 97.5 °F (36.4 °C)      TempSrc: Oral      SpO2: 95% 95% 97% (!) 93%    07/26/22 0202   BP: (!) 168/74   Pulse: 66   Resp: 20   Temp:    TempSrc:    SpO2: 95%       Abnormal Lab Results:  Labs Reviewed   CBC W/ AUTO DIFFERENTIAL - Abnormal; Notable for the following components:       Result Value    WBC 20.30 (*)     MCH 32.5 (*)     Immature Granulocytes 0.6 (*)     Gran # (ANC) 16.5 (*)     Immature Grans (Abs) 0.12 (*)     Mono # 1.6 (*)     Gran % 81.4 (*)     Lymph % 10.0 (*)     All other components within normal limits   COMPREHENSIVE METABOLIC PANEL - Abnormal; Notable for the following components:    Potassium 3.4 (*)     CO2 22 (*)     Glucose 132 (*)     Alkaline Phosphatase 39 (*)     ALT 9 (*)     All other components within normal limits   LIPASE - Abnormal; Notable for the following components:    Lipase 80 (*)     All other components within normal limits   TROPONIN I - Abnormal; Notable for the following components:    Troponin I 0.036 (*)     All other components within normal limits   URINALYSIS, REFLEX TO URINE CULTURE - Abnormal; Notable for the following components:    Color, UA Colorless (*)     Specific Gravity, UA <1.005 (*)     Protein, UA Trace (*)     All other components within normal limits    Narrative:     Specimen Source->Urine   SARS-COV-2 RNA AMPLIFICATION, QUAL - Abnormal; Notable for the following components:    SARS-CoV-2 RNA, Amplification, Qual Positive (*)     All other components within  normal limits        All Lab Results:  Results for orders placed or performed during the hospital encounter of 07/25/22   CBC auto differential   Result Value Ref Range    WBC 20.30 (H) 3.90 - 12.70 K/uL    RBC 4.40 4.00 - 5.40 M/uL    Hemoglobin 14.3 12.0 - 16.0 g/dL    Hematocrit 43.0 37.0 - 48.5 %    MCV 98 82 - 98 fL    MCH 32.5 (H) 27.0 - 31.0 pg    MCHC 33.3 32.0 - 36.0 g/dL    RDW 12.7 11.5 - 14.5 %    Platelets 279 150 - 450 K/uL    MPV 9.5 9.2 - 12.9 fL    Immature Granulocytes 0.6 (H) 0.0 - 0.5 %    Gran # (ANC) 16.5 (H) 1.8 - 7.7 K/uL    Immature Grans (Abs) 0.12 (H) 0.00 - 0.04 K/uL    Lymph # 2.0 1.0 - 4.8 K/uL    Mono # 1.6 (H) 0.3 - 1.0 K/uL    Eos # 0.0 0.0 - 0.5 K/uL    Baso # 0.05 0.00 - 0.20 K/uL    nRBC 0 0 /100 WBC    Gran % 81.4 (H) 38.0 - 73.0 %    Lymph % 10.0 (L) 18.0 - 48.0 %    Mono % 7.7 4.0 - 15.0 %    Eosinophil % 0.1 0.0 - 8.0 %    Basophil % 0.2 0.0 - 1.9 %    Differential Method Automated    Comprehensive metabolic panel   Result Value Ref Range    Sodium 140 136 - 145 mmol/L    Potassium 3.4 (L) 3.5 - 5.1 mmol/L    Chloride 104 95 - 110 mmol/L    CO2 22 (L) 23 - 29 mmol/L    Glucose 132 (H) 70 - 110 mg/dL    BUN 14 10 - 30 mg/dL    Creatinine 0.8 0.5 - 1.4 mg/dL    Calcium 10.4 8.7 - 10.5 mg/dL    Total Protein 7.4 6.0 - 8.4 g/dL    Albumin 4.0 3.5 - 5.2 g/dL    Total Bilirubin 0.5 0.1 - 1.0 mg/dL    Alkaline Phosphatase 39 (L) 55 - 135 U/L    AST 19 10 - 40 U/L    ALT 9 (L) 10 - 44 U/L    Anion Gap 14 8 - 16 mmol/L    eGFR if African American >60 >60 mL/min/1.73 m^2    eGFR if non African American >60 >60 mL/min/1.73 m^2   Lipase   Result Value Ref Range    Lipase 80 (H) 4 - 60 U/L   Troponin I   Result Value Ref Range    Troponin I 0.036 (H) 0.000 - 0.026 ng/mL   Urinalysis, Reflex to Urine Culture Urine, Clean Catch    Specimen: Urine   Result Value Ref Range    Specimen UA Urine, Catheterized     Color, UA Colorless (A) Yellow, Straw, Mariaelena    Appearance, UA Clear Clear    pH,  UA 7.0 5.0 - 8.0    Specific Gravity, UA <1.005 (A) 1.005 - 1.030    Protein, UA Trace (A) Negative    Glucose, UA Negative Negative    Ketones, UA Negative Negative    Bilirubin (UA) Negative Negative    Occult Blood UA Negative Negative    Nitrite, UA Negative Negative    Urobilinogen, UA Negative <2.0 EU/dL    Leukocytes, UA Negative Negative   COVID-19 Rapid Screening   Result Value Ref Range    SARS-CoV-2 RNA, Amplification, Qual Positive (A) Negative         Imaging Results:  Imaging Results          CT Head Without Contrast (Final result)  Result time 07/25/22 23:48:58    Final result by Chinmay Stockton MD (07/25/22 23:48:58)                 Impression:      No acute intracranial CT abnormality identified.    Encephalomalacia and senescent change as above.    Anterior scalp injury.    All CT scans at this facility are performed  using dose modulation techniques as appropriate to performed exam including the following:  automated exposure control; adjustment of mA and/or kV according to the patients size (this includes techniques or standardized protocols for targeted exams where dose is matched to indication/reason for exam: i.e. extremities or head);  iterative reconstruction technique.      Electronically signed by: Chinmay Stockton  Date:    07/25/2022  Time:    23:48             Narrative:    EXAMINATION:  CT HEAD WITHOUT CONTRAST    CLINICAL HISTORY:  Head trauma, minor (Age >= 65y);Syncope, simple, normal neuro exam;    TECHNIQUE:  Low dose axial CT images obtained throughout the head without intravenous contrast. Sagittal and coronal reconstructions were performed.    COMPARISON:  07/04/2022.    FINDINGS:  Intracranial compartment:    Ventricles and sulci are normal in size for age without evidence of hydrocephalus. No extra-axial blood or fluid collections.    Right temporal and parietal encephalomalacia.  This is stable from the prior exam.  Moderate microvascular ischemic change.  Bilateral basal  ganglia lacunar infarcts.  No parenchymal mass, hemorrhage, edema or major vascular distribution infarct.    Skull/extracranial contents (limited evaluation): No fracture. Mastoid air cells and paranasal sinuses are essentially clear.  Anterior soft tissue swelling.  No radiopaque foreign body associated.                               X-Ray Chest AP Portable (Final result)  Result time 07/25/22 23:35:26    Final result by Chinmay Stockton MD (07/25/22 23:35:26)                 Impression:      No acute abnormality.      Electronically signed by: Chinmay Stockton  Date:    07/25/2022  Time:    23:35             Narrative:    EXAMINATION:  XR CHEST AP PORTABLE    CLINICAL HISTORY:  syncope;    TECHNIQUE:  Single frontal view of the chest was performed.    COMPARISON:  Multiple priors    FINDINGS:  The lungs are clear, with normal appearance of pulmonary vasculature and no pleural effusion or pneumothorax.    The cardiac silhouette is normal in size. The hilar and mediastinal contours are unremarkable.    Degenerative change of the shoulders.                                 The EKG was ordered, reviewed, and independently interpreted by the ED provider.  Interpretation time: 23:37  Rate: 60 BPM  Rhythm: Junctional rhythm  Interpretation: Left axis deviation. Inferior infarct, age undetermined. ST and T wave abnormality, consider anterolateral ischemia. No STEMI.    The Emergency Provider reviewed the vital signs and test results, which are outlined above.     ED Discussion       1:32 AM: Discussed case with Amanda Mas NP (Hospital Medicine). Dr. Valencia agrees with current care and management of pt and accepts admission.   Admitting Service: Hospital Medicine  Admitting Physician: Dr. Valencia  Admit to: Obs unit    1:32 AM: Re-evaluated pt. I have discussed test results, shared treatment plan, and the need for admission with patient and family at bedside. Pt and family express understanding at this time and agree with  all information. All questions answered. Pt and family have no further questions or concerns at this time. Pt is ready for admit.         Medical Decision Making:   Clinical Tests:   Lab Tests: Ordered and Reviewed  Radiological Study: Ordered and Reviewed  Medical Tests: Ordered and Reviewed           ED Medication(s):  Medications   sodium chloride 0.9% bolus 250 mL (0 mLs Intravenous Stopped 7/26/22 0043)   aspirin tablet 325 mg (325 mg Oral Given 7/26/22 0041)       New Prescriptions    No medications on file               Scribe Attestation:   Scribe #1: I performed the above scribed service and the documentation accurately describes the services I performed. I attest to the accuracy of the note.     Attending:   Physician Attestation Statement for Scribe #1: I, Roberto Purdy Jr., MD, personally performed the services described in this documentation, as scribed by Cal Guardado, in my presence, and it is both accurate and complete.           Clinical Impression       ICD-10-CM ICD-9-CM   1. Troponin level elevated  R77.8 790.6   2. Syncope  R55 780.2   3. Leukocytosis, unspecified type  D72.829 288.60   4. COVID-19  U07.1 079.89       Disposition:   Disposition: Placed in Observation  Condition: Serious         Roberto Purdy Jr., MD  07/26/22 0231       Roberto Purdy Jr., MD  07/26/22 0231

## 2022-07-26 NOTE — H&P
"[facility] - Emergency Dept.  [facility]  History & Physical    Patient Name: Martine Garvey  MRN: [id number]  Patient Class: OP- Observation  Admission Date: 7/25/2022  Attending Physician: Rajan Guillen MD   Primary Care Provider: Primary Doctor No         Patient information was obtained from patient, past medical records and ER records.     Subjective:     Principal Problem:Leucocytosis    Chief Complaint:   Chief Complaint   Patient presents with    Loss of Consciousness     Pt was found by EMS with a reduced level of consciousness. Once stimulated pt became alert and oriented. She was initially hypotensive. It improved with stimulation. Family reported to EMS that she took a extra Amlodapine to treat her hypertension today.         HPI:     History was obtained from the electronic chart .  Lupe Jiang Daughter [phone]     89-year-old white female with  history of prior seizures as per daughter who was brought in by the Ems as she was noted to have a "staring spell,bobbing her head and puffing   . She had the same symptoms - 07/04/22.   When EMS came, her SBP was 69/40 as per daughter syncopal episode prior to arrival.  She has a mild contusion to her forehead.   She lives with her daughter and stays at home most time . Daughter reports dysuria   Labs and imaging history reviewed .  CT head -No acute lesion   Chest x-ray - no acute lesion   K -3.4  CBC -wbc -20   Covid assay is positive, daughter request repeat .  She will be placed on observation     Code status discussed -she is DNR      Past Medical History:   Diagnosis Date    Afib     Coronary artery disease     Depression     Hypertension     Thyroid disease        Past Surgical History:   Procedure Laterality Date    APPENDECTOMY      BRAIN SURGERY      CHOLECYSTECTOMY      EYE SURGERY      TONSILLECTOMY         Review of patient's allergies indicates:   Allergen Reactions    Bactrim " [sulfamethoxazole-trimethoprim] Rash    Gentamicin Rash    Sulfa (sulfonamide antibiotics) Rash       No current facility-administered medications on file prior to encounter.     Current Outpatient Medications on File Prior to Encounter   Medication Sig    aspirin (ECOTRIN) 81 MG EC tablet Take 81 mg by mouth once daily.    atorvastatin (LIPITOR) 20 MG tablet Take 20 mg by mouth once daily.    digoxin (LANOXIN) 62.5 mcg Tab Take by mouth.    escitalopram oxalate (LEXAPRO) 5 MG Tab Take 5 mg by mouth once daily.    levothyroxine (SYNTHROID) 75 MCG tablet Take 75 mcg by mouth once daily.    lisinopril (PRINIVIL,ZESTRIL) 20 MG tablet Take 20 mg by mouth once daily.    RIVAROXABAN (XARELTO ORAL) Take 5 mg by mouth once daily.     Family History    None       Tobacco Use    Smoking status: Never Smoker    Smokeless tobacco: Not on file   Substance and Sexual Activity    Alcohol use: No    Drug use: No    Sexual activity: Not on file     Review of Systems   Unable to perform ROS: Age   Objective:     Vital Signs (Most Recent):  Temp: 97.5 °F (36.4 °C) (07/25/22 2106)  Pulse: 61 (07/26/22 0403)  Resp: (!) 27 (07/26/22 0403)  BP: (!) 169/73 (07/26/22 0403)  SpO2: (!) 94 % (07/26/22 0403)   Vital Signs (24h Range):  Temp:  [97.5 °F (36.4 °C)] 97.5 °F (36.4 °C)  Pulse:  [61-71] 61  Resp:  [15-27] 27  SpO2:  [93 %-97 %] 94 %  BP: (130-187)/(57-77) 169/73     Weight: 54.3 kg (119 lb 11.4 oz)  Body mass index is 20.55 kg/m².    Physical Exam  Vitals and nursing note reviewed.   Constitutional:       Comments: ELDERLY    HENT:      Right Ear: Tympanic membrane normal.      Nose: Nose normal.   Eyes:      Pupils: Pupils are equal, round, and reactive to light.   Cardiovascular:      Rate and Rhythm: Normal rate.   Pulmonary:      Effort: Pulmonary effort is normal.   Abdominal:      General: Abdomen is flat.      Tenderness: There is no left CVA tenderness or rebound.   Musculoskeletal:         General: Normal range  "of motion.   Skin:     General: Skin is warm.   Neurological:      Mental Status: Mental status is at baseline.         CRANIAL NERVES     CN III, IV, VI   Pupils are equal, round, and reactive to light.     Significant Labs: All pertinent labs within the past 24 hours have been reviewed.  BMP:   Recent Labs   Lab 07/25/22  2340   *      K 3.4*      CO2 22*   BUN 14   CREATININE 0.8   CALCIUM 10.4     CBC:   Recent Labs   Lab 07/25/22  2340   WBC 20.30*   HGB 14.3   HCT 43.0          Significant Imaging: I have reviewed all pertinent imaging results/findings within the past 24 hours.    Assessment/Plan:     * Leucocytosis    Chest x-ray and UA are clear at this time, Daughter reported lower abdominal symptoms - will fo CT scan of the abdomen , start empiric  Rocephin at this time     COVID-19    Initiate standard COVID protocols; COVID-19 testing ,Infection Control notification  and isolation- respiratory, contact and droplet per protocol    Diagnostics: (leukopenia, hyponatremia, hyperferritinemia, elevated troponin, elevated d-dimer, age, and comorbidities are significant predictors of poor clinical outcome)  CBC, CMP, Ferritin, CRP and Portable CXR    Management: Inhaled bronchodilators as needed for shortness of breath.   Daughter wants a repeat test as she does not believe this test . The patient does not leave the house       Hypokalemia    Will replete , monitor in AM     Syncope    Daughter reports episodes of "seizures" or staring in space  -07/04/22 and again on this admission, -will monitor while in hospital, Neuro consult for suspected seizures    Single seizure    This is reported by the daughter on 2 occasions-07/04/22 and on this admission, will review with Neurology     PAF (paroxysmal atrial fibrillation)  Patient with Long standing persistent (>12 months) atrial fibrillation which is controlled currently with Digoxin. Patient is currently in sinus rhythm.[id number] Score: " 3. HASBLED Score: Unable to calculate. Anticoagulation indicated. Anticoagulation done with xarelto.        Hypothyroid    Will resume synthroid    HTN (hypertension)    Will monitor BP closely with her advanced age     Bilateral sensorineural hearing loss    Continue supportive care     Arrhythmia    Will monitor on Telemetry       VTE Risk Mitigation (From admission, onward)    None             Taras Valencia MD  Department of Hospital Medicine   [facility] - Emergency Dept.

## 2022-07-26 NOTE — CARE UPDATE
Pt seen today, resting in bed NAD. Continue care plan per HPI  Admitted for altered mental status suspect post-ictal with reported seizures by family. Consult tele-neuro  Repeat CBC, suspect leukocytosis s/t seizure  D/c Rocephin, no clear infectious source

## 2022-07-26 NOTE — ASSESSMENT & PLAN NOTE
Chest x-ray and UA are clear at this time, Daughter reported lower abdominal symptoms - will fo CT scan of the abdomen , start empiric  Rocephin at this time

## 2022-07-26 NOTE — SUBJECTIVE & OBJECTIVE
Past Medical History:   Diagnosis Date    Afib     Coronary artery disease     Depression     Hypertension     Thyroid disease        Past Surgical History:   Procedure Laterality Date    APPENDECTOMY      BRAIN SURGERY      CHOLECYSTECTOMY      EYE SURGERY      TONSILLECTOMY         Review of patient's allergies indicates:   Allergen Reactions    Bactrim [sulfamethoxazole-trimethoprim] Rash    Gentamicin Rash    Sulfa (sulfonamide antibiotics) Rash       No current facility-administered medications on file prior to encounter.     Current Outpatient Medications on File Prior to Encounter   Medication Sig    aspirin (ECOTRIN) 81 MG EC tablet Take 81 mg by mouth once daily.    atorvastatin (LIPITOR) 20 MG tablet Take 20 mg by mouth once daily.    digoxin (LANOXIN) 62.5 mcg Tab Take by mouth.    escitalopram oxalate (LEXAPRO) 5 MG Tab Take 5 mg by mouth once daily.    levothyroxine (SYNTHROID) 75 MCG tablet Take 75 mcg by mouth once daily.    lisinopril (PRINIVIL,ZESTRIL) 20 MG tablet Take 20 mg by mouth once daily.    RIVAROXABAN (XARELTO ORAL) Take 5 mg by mouth once daily.     Family History    None       Tobacco Use    Smoking status: Never Smoker    Smokeless tobacco: Not on file   Substance and Sexual Activity    Alcohol use: No    Drug use: No    Sexual activity: Not on file     Review of Systems   Unable to perform ROS: Age   Objective:     Vital Signs (Most Recent):  Temp: 97.5 °F (36.4 °C) (07/25/22 2106)  Pulse: 61 (07/26/22 0403)  Resp: (!) 27 (07/26/22 0403)  BP: (!) 169/73 (07/26/22 0403)  SpO2: (!) 94 % (07/26/22 0403)   Vital Signs (24h Range):  Temp:  [97.5 °F (36.4 °C)] 97.5 °F (36.4 °C)  Pulse:  [61-71] 61  Resp:  [15-27] 27  SpO2:  [93 %-97 %] 94 %  BP: (130-187)/(57-77) 169/73     Weight: 54.3 kg (119 lb 11.4 oz)  Body mass index is 20.55 kg/m².    Physical Exam  Vitals and nursing note reviewed.   Constitutional:       Comments: ELDERLY    HENT:      Right Ear: Tympanic membrane normal.      Nose:  Nose normal.   Eyes:      Pupils: Pupils are equal, round, and reactive to light.   Cardiovascular:      Rate and Rhythm: Normal rate.   Pulmonary:      Effort: Pulmonary effort is normal.   Abdominal:      General: Abdomen is flat.      Tenderness: There is no left CVA tenderness or rebound.   Musculoskeletal:         General: Normal range of motion.   Skin:     General: Skin is warm.   Neurological:      Mental Status: Mental status is at baseline.         CRANIAL NERVES     CN III, IV, VI   Pupils are equal, round, and reactive to light.     Significant Labs: All pertinent labs within the past 24 hours have been reviewed.  BMP:   Recent Labs   Lab 07/25/22  2340   *      K 3.4*      CO2 22*   BUN 14   CREATININE 0.8   CALCIUM 10.4     CBC:   Recent Labs   Lab 07/25/22  2340   WBC 20.30*   HGB 14.3   HCT 43.0          Significant Imaging: I have reviewed all pertinent imaging results/findings within the past 24 hours.

## 2022-07-26 NOTE — ASSESSMENT & PLAN NOTE
This is reported by the daughter on 2 occasions-07/04/22 and on this admission, will review with Neurology

## 2022-07-26 NOTE — ASSESSMENT & PLAN NOTE
"  Daughter reports episodes of "seizures" or staring in space  -07/04/22 and again on this admission, -will monitor while in hospital, Neuro consult for suspected seizures  "

## 2022-07-26 NOTE — ASSESSMENT & PLAN NOTE
Patient with Long standing persistent (>12 months) atrial fibrillation which is controlled currently with Digoxin. Patient is currently in sinus rhythm.[id number] Score: 3. HASBLED Score: Unable to calculate. Anticoagulation indicated. Anticoagulation done with xarelto.

## 2022-07-26 NOTE — HPI
"  History was obtained from the electronic chart .  Lupe Jiang Daughter [phone]     89-year-old white female with  history of prior seizures as per daughter who was brought in by the Ems as she was noted to have a "staring spell,bobbing her head and puffing   . She had the same symptoms - 07/04/22.   When EMS came, her SBP was 69/40 as per daughter syncopal episode prior to arrival.  She has a mild contusion to her forehead.   She lives with her daughter and stays at home most time . Daughter reports dysuria   Labs and imaging history reviewed .  CT head -No acute lesion   Chest x-ray - no acute lesion   K -3.4  CBC -wbc -20   Covid assay is positive, daughter request repeat .  She will be placed on observation     Code status discussed -she is DNR  "

## 2022-07-27 VITALS
DIASTOLIC BLOOD PRESSURE: 65 MMHG | BODY MASS INDEX: 19.79 KG/M2 | OXYGEN SATURATION: 96 % | WEIGHT: 115.94 LBS | SYSTOLIC BLOOD PRESSURE: 135 MMHG | HEART RATE: 57 BPM | TEMPERATURE: 98 F | HEIGHT: 64 IN | RESPIRATION RATE: 18 BRPM

## 2022-07-27 DIAGNOSIS — U07.1 COVID-19 VIRUS DETECTED: ICD-10-CM

## 2022-07-27 LAB
ANION GAP SERPL CALC-SCNC: 11 MMOL/L (ref 8–16)
BASOPHILS # BLD AUTO: 0.04 K/UL (ref 0–0.2)
BASOPHILS NFR BLD: 0.3 % (ref 0–1.9)
BUN SERPL-MCNC: 21 MG/DL (ref 10–30)
CALCIUM SERPL-MCNC: 9.2 MG/DL (ref 8.7–10.5)
CHLORIDE SERPL-SCNC: 106 MMOL/L (ref 95–110)
CO2 SERPL-SCNC: 22 MMOL/L (ref 23–29)
CREAT SERPL-MCNC: 1 MG/DL (ref 0.5–1.4)
DIFFERENTIAL METHOD: ABNORMAL
EOSINOPHIL # BLD AUTO: 0.2 K/UL (ref 0–0.5)
EOSINOPHIL NFR BLD: 1.5 % (ref 0–8)
ERYTHROCYTE [DISTWIDTH] IN BLOOD BY AUTOMATED COUNT: 12.9 % (ref 11.5–14.5)
EST. GFR  (AFRICAN AMERICAN): 55 ML/MIN/1.73 M^2
EST. GFR  (NON AFRICAN AMERICAN): 48 ML/MIN/1.73 M^2
GLUCOSE SERPL-MCNC: 106 MG/DL (ref 70–110)
HCT VFR BLD AUTO: 39.2 % (ref 37–48.5)
HGB BLD-MCNC: 13 G/DL (ref 12–16)
IMM GRANULOCYTES # BLD AUTO: 0.04 K/UL (ref 0–0.04)
IMM GRANULOCYTES NFR BLD AUTO: 0.3 % (ref 0–0.5)
LYMPHOCYTES # BLD AUTO: 4.7 K/UL (ref 1–4.8)
LYMPHOCYTES NFR BLD: 39.6 % (ref 18–48)
MCH RBC QN AUTO: 32.3 PG (ref 27–31)
MCHC RBC AUTO-ENTMCNC: 33.2 G/DL (ref 32–36)
MCV RBC AUTO: 97 FL (ref 82–98)
MONOCYTES # BLD AUTO: 1.4 K/UL (ref 0.3–1)
MONOCYTES NFR BLD: 11.5 % (ref 4–15)
NEUTROPHILS # BLD AUTO: 5.5 K/UL (ref 1.8–7.7)
NEUTROPHILS NFR BLD: 46.8 % (ref 38–73)
NRBC BLD-RTO: 0 /100 WBC
PLATELET # BLD AUTO: 274 K/UL (ref 150–450)
PMV BLD AUTO: 9.8 FL (ref 9.2–12.9)
POTASSIUM SERPL-SCNC: 4.1 MMOL/L (ref 3.5–5.1)
RBC # BLD AUTO: 4.03 M/UL (ref 4–5.4)
SODIUM SERPL-SCNC: 139 MMOL/L (ref 136–145)
WBC # BLD AUTO: 11.83 K/UL (ref 3.9–12.7)

## 2022-07-27 PROCEDURE — G0378 HOSPITAL OBSERVATION PER HR: HCPCS

## 2022-07-27 PROCEDURE — 80048 BASIC METABOLIC PNL TOTAL CA: CPT | Performed by: STUDENT IN AN ORGANIZED HEALTH CARE EDUCATION/TRAINING PROGRAM

## 2022-07-27 PROCEDURE — 25000003 PHARM REV CODE 250: Performed by: INTERNAL MEDICINE

## 2022-07-27 PROCEDURE — 36415 COLL VENOUS BLD VENIPUNCTURE: CPT | Performed by: STUDENT IN AN ORGANIZED HEALTH CARE EDUCATION/TRAINING PROGRAM

## 2022-07-27 PROCEDURE — 85025 COMPLETE CBC W/AUTO DIFF WBC: CPT | Performed by: STUDENT IN AN ORGANIZED HEALTH CARE EDUCATION/TRAINING PROGRAM

## 2022-07-27 RX ADMIN — LEVOTHYROXINE SODIUM 75 MCG: 75 TABLET ORAL at 10:07

## 2022-07-27 RX ADMIN — ESCITALOPRAM 5 MG: 5 TABLET, FILM COATED ORAL at 10:07

## 2022-07-27 RX ADMIN — RIVAROXABAN 5 MG: 2.5 TABLET, FILM COATED ORAL at 10:07

## 2022-07-27 RX ADMIN — LISINOPRIL 20 MG: 20 TABLET ORAL at 10:07

## 2022-07-27 RX ADMIN — ATORVASTATIN CALCIUM 20 MG: 10 TABLET, FILM COATED ORAL at 10:07

## 2022-07-27 RX ADMIN — DIGOXIN 65 MCG: 0.05 SOLUTION ORAL at 11:07

## 2022-07-27 RX ADMIN — ASPIRIN 81 MG: 81 TABLET, COATED ORAL at 10:07

## 2022-07-27 NOTE — HOSPITAL COURSE
"Placed in observation for COVID 19 infection and syncope.  Daughter described patient as being unresponsive with upper extremities jerks and "puffing" motions with mouth during syncope event. EMS contacted and she was noted hypotensive. Daughter expressed concern of this possibly being a seizure event. She also states that she gave the patient an extra dose of last earlier that morning due to her pressures being uncontrolled. Etiology of syncope suspected to be related orthostasis. Events of what patient's daughter witness was felt to be related to decreased cerebral perfusion in setting of hypotension. CT head was negative for acute findings. Regarding COVID she has remained asymptomatic and stable. Urinalysis negative for infection. She remained stable on room air. She will discharge with family support.   "

## 2022-07-27 NOTE — PLAN OF CARE
Plan of care reviewed with patient. Patient stated understanding of goals and outcomes. Will continue to educate and monitor

## 2022-07-27 NOTE — PLAN OF CARE
[facility] - Telemetry (Hospital)  Discharge Final Note    Primary Care Provider: Provider [facility]    Expected Discharge Date: 7/27/2022    Final Discharge Note (most recent)     Final Note - 07/27/22 1351        Final Note    Assessment Type Final Discharge Note     Anticipated Discharge Disposition Home or Self Care     What phone number can be called within the next 1-3 days to see how you are doing after discharge? --   [phone]       Post-Acute Status    Discharge Delays None known at this time               Pt to discharge home with family. No CM needs for discharge.     Important Message from Medicare

## 2022-07-27 NOTE — PLAN OF CARE
[facility] - Tele[facility] (Hospital)  Initial Discharge Assessment       Primary Care Provider: Primary Doctor No    Admission Diagnosis: Syncope [R55]  Troponin level elevated [R77.8]  Leukocytosis, unspecified type [D72.829]  COVID-19 [U07.1]    Admission Date: 7/25/2022  Expected Discharge Date:     Discharge Barriers Identified: None    Payor: [employer] [facility] MCARE / Plan: [employer] MEDICARE ADVANTAGE / Product Type: Medicare Advantage /     Extended Emergency Contact Information  Primary Emergency Contact: Lupe Jiang  Address: [address] [place] [address] [facility]  Home Phone: [phone]  Work Phone: [phone]  Mobile Phone: [phone]  Relation: Daughter  Preferred language: English  Secondary Emergency Contact: GarveyLenny  Mobile Phone: [phone]  Relation: Son    Discharge Plan A: Home, Home with family  Discharge Plan B: Home Health    No Pharmacies Listed    Initial Assessment (most recent)     Adult Discharge Assessment - 07/27/22 1013        Discharge Assessment    Assessment Type Discharge Planning Assessment     Confirmed/corrected address, phone number and insurance Yes     Confirmed Demographics Correct on Facesheet     Source of Information family     Communicated ANNALEE with patient/caregiver Date not available/Unable to determine     Reason For Admission Leucocytosis     Lives With child(kevin), adult     Facility Arrived From: Home     Do you expect to return to your current living situation? Yes     Do you have help at home or someone to help you manage your care at home? Yes     Who are your caregiver(s) and their phone number(s)? Pt's daughterLupe     Prior to hospitilization cognitive status: Alert/Oriented     Current cognitive status: Alert/Oriented     Walking or Climbing Stairs Difficulty ambulation difficulty, requires equipment     Mobility Management wheelchair     Dressing/Bathing Difficulty none     Home  Accessibility wheelchair accessible     Equipment Currently Used at Home wheelchair     Readmission within 30 days? Yes     Do you currently have service(s) that help you manage your care at home? Yes     Name and Contact number of agency 12 hrs/wk via [employer]; previously had [facility] and would prefer to resume if recommended     Do you take prescription medications? Yes     Do you have prescription coverage? Yes     Do you have any problems affording any of your prescribed medications? No     Is the patient taking medications as prescribed? yes     Who is going to help you get home at discharge? Pt's daughter, Lupe     How do you get to doctors appointments? family or friend will provide     Are you on dialysis? No     Do you take coumadin? No     Discharge Plan A Home;Home with family     Discharge Plan B Home Health     DME Needed Upon Discharge  wheelchair     Discharge Plan discussed with: Adult children     Discharge Barriers Identified None               Ana Maríar spoke with pt's daughter, Lupe, to complete discharge assessment. Lupe states that pt lives with her and is pt's help at home. Patient ambulates with a wheelchair. Pt has caregivers provided by [employer] that assist with house work and ADLs. Pt receives theses services 12 hr weekly. Pt recently discharged home [facility]; preference to resume if HH recommended at discharge. Lupe will assist with d/c transportation.     Swer advised Lupe how to contact SW should needs arise. No CM needs expressed or identified at this time. Ana Maríar to continue following.     Pt's PCP: Lara Martel MD at [facility] ([phone]).

## 2022-07-27 NOTE — PLAN OF CARE
"AAOx4  NAD  VSS    /65 (Patient Position: Lying)   Pulse (!) 57   Temp 98.1 °F (36.7 °C) (Oral)   Resp 18   Ht 5' 4" (1.626 m)   Wt 52.6 kg (115 lb 15.4 oz)   SpO2 96%   BMI 19.90 kg/m²       Pt to DC home with son. Pts daughter lives with her and will assist with her care.    DC instructions discussed with pt and son at BS, both verbalized understanding.  "

## 2022-07-28 NOTE — DISCHARGE SUMMARY
"[facility] - Telemetry ([facility] Medicine  Discharge Summary      Patient Name: Martine Garvey  MRN: [id number]  Patient Class: OP- Observation  Admission Date: 7/25/2022  Hospital Length of Stay: 0 days  Discharge Date and Time:  07/28/2022 8:56 AM  Attending Physician: No att. providers found   Discharging Provider: Jacqueline Olivarez NP  Primary Care Provider: [facility]      HPI:     History was obtained from the electronic chart .  Lupe Jiang Daughter [phone]     89-year-old white female with  history of prior seizures as per daughter who was brought in by the Ems as she was noted to have a "staring spell,bobbing her head and puffing   . She had the same symptoms - 07/04/22.   When EMS came, her SBP was 69/40 as per daughter syncopal episode prior to arrival.  She has a mild contusion to her forehead.   She lives with her daughter and stays at home most time . Daughter reports dysuria   Labs and imaging history reviewed .  CT head -No acute lesion   Chest x-ray - no acute lesion   K -3.4  CBC -wbc -20   Covid assay is positive, daughter request repeat .  She will be placed on observation     Code status discussed -she is DNR      * No surgery found *      Hospital Course:   Placed in observation for COVID 19 infection and syncope.  Daughter described patient as being unresponsive with upper extremities jerks and "puffing" motions with mouth during syncope event. EMS contacted and she was noted hypotensive. Daughter expressed concern of this possibly being a seizure event. She also states that she gave the patient an extra dose of last earlier that morning due to her pressures being uncontrolled. Etiology of syncope suspected to be related orthostasis. Events of what patient's daughter witness was felt to be related to decreased cerebral perfusion in setting of hypotension. CT head was negative for acute findings. Regarding COVID she has remained " asymptomatic and stable. Urinalysis negative for infection. She remained stable on room air. She will discharge with family support.        Goals of Care Treatment Preferences:  Code Status: DNR      Consults:     No new Assessment & Plan notes have been filed under this hospital service since the last note was generated.  Service: Hospital Medicine    Final Active Diagnoses:    Diagnosis Date Noted POA    PRINCIPAL PROBLEM:  Leucocytosis [D72.829] 07/26/2022 Yes    Syncope [R55] 07/26/2022 Yes    Hypokalemia [E87.6] 07/26/2022 Yes    COVID-19 [U07.1] 07/26/2022 Yes    PAF (paroxysmal atrial fibrillation) [I48.0] 02/07/2019 Yes    Single seizure [R56.9] 04/21/2016 Yes    Bilateral sensorineural hearing loss [H90.3] 01/28/2015 Yes    Arrhythmia [I49.9] 09/23/2014 Yes    HTN (hypertension) [I10] 11/20/2013 Yes    Hypothyroid [E03.9] 11/20/2013 Yes      Problems Resolved During this Admission:       Discharged Condition: stable    Disposition: Home or Self Care    Follow Up:    Patient Instructions:      Ambulatory referral/consult to Neurology   Standing Status: Future   Referral Priority: Routine Referral Type: Consultation   Referral Reason: Specialty Services Required   Requested Specialty: Neurology   Number of Visits Requested: 1       Significant Diagnostic Studies: Labs:   CMP   Recent Labs   Lab 07/27/22  0448      K 4.1      CO2 22*      BUN 21   CREATININE 1.0   CALCIUM 9.2   ANIONGAP 11   ESTGFRAFRICA 55*   EGFRNONAA 48*    and CBC   Recent Labs   Lab 07/27/22  0448   WBC 11.83   HGB 13.0   HCT 39.2          Pending Diagnostic Studies:     None         Medications:  Reconciled Home Medications:      Medication List      CONTINUE taking these medications    amLODIPine 2.5 MG tablet  Commonly known as: NORVASC  Take 2.5 mg by mouth once daily.     aspirin 81 MG EC tablet  Commonly known as: ECOTRIN  Take 81 mg by mouth once daily.     atorvastatin 20 MG tablet  Commonly  known as: LIPITOR  Take 20 mg by mouth once daily.     ciprofloxacin HCl 250 MG tablet  Commonly known as: CIPRO  Take 250 mg by mouth daily as needed.     digoxin 62.5 mcg (0.0625 mg) Tab  Take 1 tablet by mouth once daily at 6am.     EScitalopram oxalate 5 MG Tab  Commonly known as: LEXAPRO  Take 5 mg by mouth once daily.     levothyroxine 75 MCG tablet  Commonly known as: SYNTHROID  Take 75 mcg by mouth once daily.     lisinopriL 20 MG tablet  Commonly known as: PRINIVIL,ZESTRIL  Take 20 mg by mouth once daily.     montelukast 10 mg tablet  Commonly known as: SINGULAIR  Take 10 mg by mouth once daily.     MYRBETRIQ 50 mg Tb24  Generic drug: mirabegron  Take 1 tablet by mouth once daily.     XARELTO ORAL  Take 5 mg by mouth once daily.            Indwelling Lines/Drains at time of discharge:   Lines/Drains/Airways     None                 Time spent on the discharge of patient: >35 minutes         Jacqueline Olivarez NP  Department of Hospital Medicine  '[facility] - Telemetry ([facility])